# Patient Record
Sex: FEMALE | Race: NATIVE HAWAIIAN OR OTHER PACIFIC ISLANDER | NOT HISPANIC OR LATINO
[De-identification: names, ages, dates, MRNs, and addresses within clinical notes are randomized per-mention and may not be internally consistent; named-entity substitution may affect disease eponyms.]

---

## 2024-09-24 ENCOUNTER — TRANSCRIBE ORDERS (OUTPATIENT)
Dept: OTHER | Age: 59
End: 2024-09-24

## 2024-09-24 DIAGNOSIS — K86.89 MASS OF HEAD OF PANCREAS: Primary | ICD-10-CM

## 2024-09-24 DIAGNOSIS — K86.89 PANCREATIC MASS: ICD-10-CM

## 2024-09-25 ENCOUNTER — TELEPHONE (OUTPATIENT)
Dept: GASTROENTEROLOGY | Facility: CLINIC | Age: 59
End: 2024-09-25

## 2024-09-25 NOTE — TELEPHONE ENCOUNTER
Contacted patient to confirm spelling of her name as it conflicts with Care Everywhere records. Other demographic information aligns, including , phone number and address.     Spoke with patient who states that she is now in the care of [facility] and does not require follow up with our organization.     Will close referral at this time.     Vickie Fontanez RN Care Coordinator

## 2024-10-03 ENCOUNTER — TRANSCRIBE ORDERS (OUTPATIENT)
Dept: OTHER | Age: 59
End: 2024-10-03

## 2024-10-03 DIAGNOSIS — K86.89 PANCREATIC MASS: Primary | ICD-10-CM

## 2024-10-04 ENCOUNTER — PRE VISIT (OUTPATIENT)
Dept: ONCOLOGY | Facility: CLINIC | Age: 59
End: 2024-10-04

## 2024-10-05 NOTE — TELEPHONE ENCOUNTER
RECORDS STATUS - ALL OTHER DIAGNOSIS      RECORDS RECEIVED FROM: Robinson Crum   NOTES STATUS DETAILS   OFFICE NOTE from referring provider     OFFICE NOTE from medical oncologist     OFFICE NOTE from other specialist     DISCHARGE SUMMARY from hospital     DISCHARGE REPORT from the ER     OPERATIVE REPORT [facility] 09/14/24: EUS   MEDICATION LIST     LABS     PATHOLOGY REPORTS Report in [facility] 09/14/24: [id number]    ANYTHING RELATED TO DIAGNOSIS [facility] Most recent 09/17/24   PATHOLOGY [employer] TRACKING   Tracking #:   GENONOMIC TESTING     TYPE:     IMAGING (NEED IMAGES & REPORT)     CT SCANS Req 10/04[facility]:  09/11/24: CT Abd   MRI Req 10/04-[facility]:  09/10/24: MR Abd   ULTRASOUND Req 10/04-[facility]:  09/09/24: US Abd    [place]:  09/09/24: US Abd   PET Req 10/04-[facility]:  02/05/24-01/03/23: PET CT Whole Body

## 2024-10-08 ENCOUNTER — PATIENT OUTREACH (OUTPATIENT)
Dept: SURGERY | Facility: CLINIC | Age: 59
End: 2024-10-08

## 2024-10-08 NOTE — PROGRESS NOTES
New Patient Oncology Nurse Navigator Note     Referring provider: Dr. Whitaker     Referring Clinic/Organization: [facility]     Referred to: Surgical Oncology -  Hepatobiliary / GI Cancers     Requested provider (if applicable): First available provider    Referral Received: 10/08/24       Evaluation for :  pancreas mass      Clinical History (per Nurse review of records provided):      See book marked documents:     Referring MD office note  Pathology report  Imaging reports   Procedure report       Clinical Assessment / Barriers to Care (Per Nurse):    None at this time.     Records Location:     [facility] Everywhere     Records Needed:     ABDOMINAL IMAGING (CT SCANS, MRI, US, PET SCANS)  DATING BACK TO 2018      Additional testing needed prior to consult:     NONE AT THIS TIME    Referral updates and Plan:       Consult with Surgical Oncology     10/08/2024 11:58 AM - called and left a message for patient to call back to schedule consult with surgical oncology, provided scheduling line and my number for call back.     10/08/2024 3:45 PM - attempted to reach patient again to schedule consult with surgery. Called patients referring office to update them that our office is trying to reach her to see if they can also get message to patient to call our scheduling line back to proceed with scheduling. Spoke with Coco BLACKWELL at referring office that we are trying to reach her and that we have a time on Monday available for a virtual visit if should would like. Provided her the scheduling line as well and she will also try and reach patient to call our scheduling line back.     Lily Ko, RN, BSN   Surgical Oncology New Patient Nurse Navigator  [facility]  [phone]

## 2024-10-11 NOTE — TELEPHONE ENCOUNTER
RECORDS STATUS - ALL OTHER DIAGNOSIS      RECORDS RECEIVED FROM: Radames Bowers   DATE RECEIVED:    NOTES STATUS DETAILS   OFFICE NOTE from medical oncologist TUTU Whitaker: 10/10/24   OFFICE NOTE from radiation oncologist TUTU Johns: 6/21/23   MEDICATION LIST  Radames Bowers   LABS     PATHOLOGY REPORTS [facility], Reports in  9/14/24: [id number]  9/11/24: [id number]   ANYTHING RELATED TO DIAGNOSIS Epic/ 10/10/24   IMAGING (NEED IMAGES & REPORT)     CT SCANS PACS [place]  10/10/24    [facility]  9/11/24   MRI PACS [facility]  9/10/24   ULTRASOUND PACS [place]  9/9/24   PET PACS [place]  10/8/24, 2/5/24, 8/29/23, 1/3/23

## 2024-10-14 ENCOUNTER — PRE VISIT (OUTPATIENT)
Dept: SURGERY | Facility: CLINIC | Age: 59
End: 2024-10-14
Payer: COMMERCIAL

## 2024-10-14 ENCOUNTER — VIRTUAL VISIT (OUTPATIENT)
Dept: SURGERY | Facility: CLINIC | Age: 59
End: 2024-10-14
Attending: SURGERY
Payer: COMMERCIAL

## 2024-10-14 VITALS — WEIGHT: 195 LBS | BODY MASS INDEX: 33.29 KG/M2 | HEIGHT: 64 IN

## 2024-10-14 DIAGNOSIS — R17 JAUNDICE: ICD-10-CM

## 2024-10-14 DIAGNOSIS — K86.89 MASS OF HEAD OF PANCREAS: Primary | ICD-10-CM

## 2024-10-14 DIAGNOSIS — K85.00 IDIOPATHIC ACUTE PANCREATITIS WITHOUT INFECTION OR NECROSIS: ICD-10-CM

## 2024-10-14 DIAGNOSIS — C44.42 SQUAMOUS CELL CANCER OF SKIN OF CROWN: ICD-10-CM

## 2024-10-14 PROCEDURE — 99204 OFFICE O/P NEW MOD 45 MIN: CPT | Mod: 95 | Performed by: SURGERY

## 2024-10-14 RX ORDER — LEVOTHYROXINE SODIUM 150 UG/1
150 TABLET ORAL
COMMUNITY
Start: 2024-07-09

## 2024-10-14 RX ORDER — GABAPENTIN 300 MG/1
300 CAPSULE ORAL
COMMUNITY
Start: 2024-08-16

## 2024-10-14 RX ORDER — OXYCODONE HYDROCHLORIDE 5 MG/1
5 TABLET ORAL
COMMUNITY
Start: 2024-10-10

## 2024-10-14 ASSESSMENT — PAIN SCALES - GENERAL: PAINLEVEL: NO PAIN (0)

## 2024-10-14 NOTE — LETTER
10/14/2024      Scarlet Shaikh  [address] [facility] [address]      Dear Colleague,    Thank you for referring your patient, Scarlet Shaikh, to the [facility]. Please see a copy of my visit note below.    Virtual Visit Details    Type of service:  Video Visit   Video Start Time: 8:30 AM  Video End Time:8:53 AM    Originating Location (pt. Location): Home    Distant Location (provider location):  On-site  Platform used for Video Visit: Mignon    Ms. 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  She is currently receiving radiation and immunotherapy for treatment of that.  She reports good response to treatment so far.  Recently the patient developed abnormal LFTs and was further evaluated.  She is found to have a fullness/mass in the pancreas with upstream biliary and pancreatic duct dilation.  This was further evaluated with an endoscopic ultrasound as well as ERCP which confirmed the same.  Biopsies were taken both by brushings as well as FNA.  Both returned benign with no evidence of malignancy.  FISH testing was also done and also was negative.  The patient subsequently developed abdominal pain, which is worse when eating.  She states that she feels like she is getting punched in the gut.  Her lipase has been mildly elevated between 65 and 75.  I was asked her to see her for consideration of next steps of evaluation and management.    Recent CT as well as MRI reveals a nondiscrete mass in the area of the head/neck of the pancreas which appeared to involve the superior mesenteric vein with narrowing.  There is soft tissue changes as well.  There has been no obvious evidence of metastatic disease on multiple PET scans.    I discussed with the patient that the findings of her imaging are very concerning for cancer however her biopsies have not shown that.  I discussed that it is possible that  this is related to pancreatitis, either idiopathic, gallstone related, or due to her immunotherapy for squamous cell cancer.  Having said that I did let her know that the most worrisome thing is that there still could be a cancer there that the biopsies have just failed to prove so far.  Because of that I recommended that we repeat an endoscopic ultrasound and repeat biopsies of this area.  She would like to come to the [facility] for that assessment, which I will arrange for her.    I discussed with the patient that if there is no evidence of malignancy, we would not plan any surgical intervention and rather she would be managed medically for pancreatitis or other etiologies.  If however cancer is identified, I discussed with her that it is involving the major vasculature of her pillo and therefore she would require upfront chemotherapy typically for 6 months followed by reassessment and potential surgery down the road.  This would all have to be considered in light of the fact that she also has squamous cell carcinoma of her scalp with regional lymph node disease as well.  It may be that surgical intervention is not ultimately in her best interest.    We will get her set up to see our GI group for an endoscopic ultrasound as quickly as possible.  I will plan to see the patient shortly thereafter to discuss the results.    The patient inquired about a prescription for oxycodone which I declined.  I indicated to her that she should follow-up with her pain team who currently manages her medications for any need for prescriptions.  She was very accepting of that.    30 minutes were spent face-to-face time.  20 minutes was spent in review of history, imaging, coordination of care.  5 minutes was spent in documentation.    The above was transcribed using Dragon voice recognition software that is now required for use by [facility] and [facility] in place of transcription of  dictated notes.  This change may unfortunately lead into an increase in errors in the EMR. While I reviewed and edited the transcription, I may miss errors.  Please let me know of any of serious errors and I will addend the note.       Again, thank you for allowing me to participate in the care of your patient.        Sincerely,        Armaan Pierce MD

## 2024-10-14 NOTE — PROGRESS NOTES
Virtual Visit Details    Type of service:  Video Visit   Video Start Time: 8:30 AM  Video End Time:8:53 AM    Originating Location (pt. Location): Home    Distant Location (provider location):  On-site  Platform used for Video Visit: AkosuaPete    Ms. 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  She is currently receiving radiation and immunotherapy for treatment of that.  She reports good response to treatment so far.  Recently the patient developed abnormal LFTs and was further evaluated.  She is found to have a fullness/mass in the pancreas with upstream biliary and pancreatic duct dilation.  This was further evaluated with an endoscopic ultrasound as well as ERCP which confirmed the same.  Biopsies were taken both by brushings as well as FNA.  Both returned benign with no evidence of malignancy.  FISH testing was also done and also was negative.  The patient subsequently developed abdominal pain, which is worse when eating.  She states that she feels like she is getting punched in the gut.  Her lipase has been mildly elevated between 65 and 75.  I was asked her to see her for consideration of next steps of evaluation and management.    Recent CT as well as MRI reveals a nondiscrete mass in the area of the head/neck of the pancreas which appeared to involve the superior mesenteric vein with narrowing.  There is soft tissue changes as well.  There has been no obvious evidence of metastatic disease on multiple PET scans.    I discussed with the patient that the findings of her imaging are very concerning for cancer however her biopsies have not shown that.  I discussed that it is possible that this is related to pancreatitis, either idiopathic, gallstone related, or due to her immunotherapy for squamous cell cancer.  Having said that I did let her know that the most worrisome thing is that there still could be a cancer there that the biopsies have just  failed to prove so far.  Because of that I recommended that we repeat an endoscopic ultrasound and repeat biopsies of this area.  She would like to come to the [facility] for that assessment, which I will arrange for her.    I discussed with the patient that if there is no evidence of malignancy, we would not plan any surgical intervention and rather she would be managed medically for pancreatitis or other etiologies.  If however cancer is identified, I discussed with her that it is involving the major vasculature of her pillo and therefore she would require upfront chemotherapy typically for 6 months followed by reassessment and potential surgery down the road.  This would all have to be considered in light of the fact that she also has squamous cell carcinoma of her scalp with regional lymph node disease as well.  It may be that surgical intervention is not ultimately in her best interest.    We will get her set up to see our GI group for an endoscopic ultrasound as quickly as possible.  I will plan to see the patient shortly thereafter to discuss the results.    The patient inquired about a prescription for oxycodone which I declined.  I indicated to her that she should follow-up with her pain team who currently manages her medications for any need for prescriptions.  She was very accepting of that.    30 minutes were spent face-to-face time.  20 minutes was spent in review of history, imaging, coordination of care.  5 minutes was spent in documentation.    The above was transcribed using Dragon voice recognition software that is now required for use by [facility] and [facility] in place of transcription of dictated notes.  This change may unfortunately lead into an increase in errors in the EMR. While I reviewed and edited the transcription, I may miss errors.  Please let me know of any of serious errors and I will addend the note.

## 2024-10-14 NOTE — NURSING NOTE
Current patient location: [address] [place] [id number]    Is the patient currently in the state of [place]? YES    Visit mode:VIDEO    If the visit is dropped, the patient can be reconnected by: VIDEO VISIT: Text to cell phone:   Telephone Information:   Mobile [phone]       Will anyone else be joining the visit? NO  (If patient encounters technical issues they should call [phone]    Are changes needed to the allergy or medication list? Yes Pt is also taking amoxicillin.    Are refills needed on medications prescribed by this physician? NO    Rooming Documentation:  Questionnaire(s) not done per department protocol    Reason for visit: Consult    Skye DE PAZ

## 2024-10-15 ENCOUNTER — PATIENT OUTREACH (OUTPATIENT)
Dept: GASTROENTEROLOGY | Facility: CLINIC | Age: 59
End: 2024-10-15
Payer: COMMERCIAL

## 2024-10-15 NOTE — TELEPHONE ENCOUNTER
Called pt to discuss referral from Dr Pierce for a second opinion EUS/biopsy for possible pancreas CA diagnosis. Pt lives in [place] and does not drive. She would need to find someone within her [place] or a friend to provide transporation. Will call back to discuss timing of procedure, writer did offer Thurs 10/17 as a possibility but Dr Smith also still reviewing request.      PET 10/8/24   IMPRESSION:     Extensive muscular activity and large area of photopenia within the abdomen.  This could decrease sensitivity for detection of FDG avid lesions.   Consider repeat examination.     Focus of increased activity localizing to the sigmoid colon suspicious for colonic neoplasm.  Endoscopic correlation is recommended.     Biliary stent with nonspecific gallbladder wall thickening/fat stranding which could indicate an inflammatory process.   Otherwise, no convincing findings to explain elevated liver enzymes.     Nonspecific soft tissue attenuation about the pancreas which could indicate an inflammatory process or tumor.  Further clinical correlation is advised     Outside EUS done on 9/14/24   FINAL DIAGNOSIS   A. Pancreas, endoscopic ultrasound-guided fine-needle aspiration:                -[place] columnar epithelium of likely gastrointestinal origin admixed with some inflammatory cells and                  debris.      B. Pancreas, endoscopic ultrasound-guided fine-needle aspiration:                -No cytologic evidence of malignancy.      C. Pancreas, endoscopic ultrasound-guided fine-needle aspiration:                -Rare minute strips of bland columnar epithelium of likely gastrointestinal origin admixed with blood.     10/10/24   Lipase   <=60 U/L 75 High          Explained they  will need a , someone to stay with them for 24 hours and should stay in town for 24 hours (within 45 min of Hospital) post procedure    Patient needs to get pre-op physical completed. If outside  health system will need physical  faxed to number [phone]     If you do not get a preop physical, your procedure could be cancelled, patient voiced understanding*    Preop Plan: oncology visit with Dr Whitaker, 10/10/24 at [place] to be utilized    Does patient have any history of gastric bypass/gastric surgery/altered panc/bili anatomy? none    Any recent Covid symptoms or positive covid test?     Does patient have [employer] insurance?:prollie    Med Review    Blood thinner -  none  ASA - none  Diabetic - none  Any meds by injection or mouth for weight loss or diabetes- none    Patient Education r/t procedure: letter    A pre-op nurse will call 1-2 days prior to the procedure.    NPO/Prep:   Adults and Children of all ages may consume solids up to 8 hours prior to arrival time - may consume clear liquids up to 1 hour prior to arrival time.    Verbalized understanding of all instructions. All questions answered.     Procedure order placed, message routed to OR

## 2024-10-16 ENCOUNTER — PREP FOR PROCEDURE (OUTPATIENT)
Dept: GASTROENTEROLOGY | Facility: CLINIC | Age: 59
End: 2024-10-16
Payer: COMMERCIAL

## 2024-10-16 ENCOUNTER — ANESTHESIA EVENT (OUTPATIENT)
Dept: SURGERY | Facility: CLINIC | Age: 59
End: 2024-10-16
Payer: COMMERCIAL

## 2024-10-16 DIAGNOSIS — K86.89 PANCREATIC MASS: ICD-10-CM

## 2024-10-16 DIAGNOSIS — K85.00 IDIOPATHIC ACUTE PANCREATITIS WITHOUT INFECTION OR NECROSIS: Primary | ICD-10-CM

## 2024-10-16 DIAGNOSIS — R17 JAUNDICE: ICD-10-CM

## 2024-10-16 RX ORDER — AMOXICILLIN 250 MG
1 CAPSULE ORAL 2 TIMES DAILY
COMMUNITY
Start: 2023-05-26

## 2024-10-16 RX ORDER — CYCLOBENZAPRINE HCL 5 MG
5-10 TABLET ORAL PRN
COMMUNITY
Start: 2023-12-18 | End: 2024-12-22

## 2024-10-16 ASSESSMENT — LIFESTYLE VARIABLES: TOBACCO_USE: 1

## 2024-10-16 ASSESSMENT — COPD QUESTIONNAIRES: COPD: 0

## 2024-10-16 NOTE — TELEPHONE ENCOUNTER
Pt returned call and confirmed that 10/17 with arrival around noon will work. Her daughter will provide transportation. Waiting for Dr Smith's final review to determine if ERCP is needed, but will plan on EUS with MAC , 20 mins scope time. Order placed and msg routed to OR .  Pt does not have mychart or email, provided address and parking options. Did tell pt to expect PAN call to confirm arrival time and discuss NPO guidelines. Pt will be staying locally for the 24 hours following. Will update Dr Pierce's team once plan is solidified with Dr Smith.

## 2024-10-16 NOTE — ANESTHESIA PREPROCEDURE EVALUATION
"Anesthesia Pre-Procedure Evaluation    Patient: Scarlet Shaikh   MRN: [id number] : 1965        Procedure : Procedure(s):  ENDOSCOPIC ULTRASOUND, ESOPHAGOSCOPY / UPPER GASTROINTESTINAL TRACT (GI)          No past medical history on file.   No past surgical history on file.   Allergies   Allergen Reactions    Acetaminophen Other (See Comments)     Can not have acetaminophen due to liver issues (advised by PCP not to take).    Aspirin GI Disturbance    Ibuprofen GI Disturbance      Social History     Tobacco Use    Smoking status: Every Day     Types: Cigarettes    Smokeless tobacco: Never   Substance Use Topics    Alcohol use: Not on file      Wt Readings from Last 1 Encounters:   10/14/24 88.5 kg (195 lb)        Anesthesia Evaluation            ROS/MED HX  ENT/Pulmonary:     (+) sleep apnea,               tobacco use,                     (-) COPD   Neurologic:     (+)    peripheral neuropathy,                         (-) no CVA and no TIA   Cardiovascular:     (+)  - -  CAD -  - -                                   (-) angina, past MI, stent, angina, past MI and CABG   METS/Exercise Tolerance:  Comment: Mets of 4   Hematologic: Comments: Iron deficiency anemia    Last Hgb 12    (+)      anemia,          Musculoskeletal:       GI/Hepatic: Comment: Pancreatitis  Pancreatic mass  Jaundice    Choledocholithiasis, now post ERCP    Elevated LFTs    (+) GERD,            liver disease,       Renal/Genitourinary:       Endo:     (+)          thyroid problem, hypothyroidism,    Obesity,       Psychiatric/Substance Use:     (+) psychiatric history anxiety       Infectious Disease:       Malignancy: Comment: Squamous cell carcinoma of the scalp  (+) Malignancy, History of Skin and Other.Other CA Mass of head of pancreas status post.    Other:      (+)  , H/O Chronic Pain,            OUTSIDE LABS:  CBC: No results found for: \"WBC\", \"HGB\", \"HCT\", \"PLT\"  BMP: No results found for: \"NA\", \"POTASSIUM\", \"CHLORIDE\", " "\"CO2\", \"BUN\", \"CR\", \"GLC\"  COAGS: No results found for: \"PTT\", \"INR\", \"FIBR\"  POC: No results found for: \"BGM\", \"HCG\", \"HCGS\"  HEPATIC: No results found for: \"ALBUMIN\", \"PROTTOTAL\", \"ALT\", \"AST\", \"GGT\", \"ALKPHOS\", \"BILITOTAL\", \"BILIDIRECT\", \"NATHALIE\"  OTHER: No results found for: \"PH\", \"LACT\", \"A1C\", \"ALEXANDRA\", \"PHOS\", \"MAG\", \"LIPASE\", \"AMYLASE\", \"TSH\", \"T4\", \"T3\", \"CRP\", \"SED\"    Anesthesia Plan    ASA Status:  3       Anesthesia Type: MAC.     - Reason for MAC: immobility needed, straight local not clinically adequate   Induction: Intravenous, Propofol.   Maintenance: TIVA.   Techniques and Equipment:     - Lines/Monitors: BIS     Consents            Postoperative Care    Pain management: IV analgesics, Oral pain medications.   PONV prophylaxis: Ondansetron (or other 5HT-3), Dexamethasone or Solumedrol     Comments:               Martínez Bojorquez MD    I have reviewed the pertinent notes and labs in the chart from the past 30 days and (re)examined the patient.  Any updates or changes from those notes are reflected in this note.                       # Obesity: Estimated body mass index is 33.47 kg/m  as calculated from the following:    Height as of 10/14/24: 1.626 m (5' 4\").    Weight as of 10/14/24: 88.5 kg (195 lb).             "

## 2024-10-17 ENCOUNTER — ANESTHESIA (OUTPATIENT)
Dept: SURGERY | Facility: CLINIC | Age: 59
End: 2024-10-17
Payer: COMMERCIAL

## 2024-10-17 ENCOUNTER — HOSPITAL ENCOUNTER (OUTPATIENT)
Facility: CLINIC | Age: 59
Discharge: HOME HEALTH | End: 2024-10-17
Attending: INTERNAL MEDICINE | Admitting: INTERNAL MEDICINE
Payer: COMMERCIAL

## 2024-10-17 VITALS
OXYGEN SATURATION: 99 % | TEMPERATURE: 97.9 F | SYSTOLIC BLOOD PRESSURE: 150 MMHG | HEIGHT: 64 IN | WEIGHT: 198.19 LBS | BODY MASS INDEX: 33.84 KG/M2 | DIASTOLIC BLOOD PRESSURE: 88 MMHG | RESPIRATION RATE: 16 BRPM | HEART RATE: 89 BPM

## 2024-10-17 PROCEDURE — 88305 TISSUE EXAM BY PATHOLOGIST: CPT | Mod: 26 | Performed by: PATHOLOGY

## 2024-10-17 PROCEDURE — 250N000011 HC RX IP 250 OP 636: Performed by: NURSE ANESTHETIST, CERTIFIED REGISTERED

## 2024-10-17 PROCEDURE — 272N000001 HC OR GENERAL SUPPLY STERILE: Performed by: INTERNAL MEDICINE

## 2024-10-17 PROCEDURE — 999N000141 HC STATISTIC PRE-PROCEDURE NURSING ASSESSMENT: Performed by: INTERNAL MEDICINE

## 2024-10-17 PROCEDURE — 710N000012 HC RECOVERY PHASE 2, PER MINUTE: Performed by: INTERNAL MEDICINE

## 2024-10-17 PROCEDURE — 360N000075 HC SURGERY LEVEL 2, PER MIN: Performed by: INTERNAL MEDICINE

## 2024-10-17 PROCEDURE — 88173 CYTOPATH EVAL FNA REPORT: CPT | Mod: 26 | Performed by: PATHOLOGY

## 2024-10-17 PROCEDURE — 258N000003 HC RX IP 258 OP 636: Performed by: NURSE ANESTHETIST, CERTIFIED REGISTERED

## 2024-10-17 PROCEDURE — 250N000009 HC RX 250: Performed by: NURSE ANESTHETIST, CERTIFIED REGISTERED

## 2024-10-17 PROCEDURE — 43238 EGD US FINE NEEDLE BX/ASPIR: CPT | Performed by: ANESTHESIOLOGY

## 2024-10-17 PROCEDURE — 88172 CYTP DX EVAL FNA 1ST EA SITE: CPT | Mod: 26 | Performed by: PATHOLOGY

## 2024-10-17 PROCEDURE — 370N000017 HC ANESTHESIA TECHNICAL FEE, PER MIN: Performed by: INTERNAL MEDICINE

## 2024-10-17 PROCEDURE — 43238 EGD US FINE NEEDLE BX/ASPIR: CPT | Performed by: NURSE ANESTHETIST, CERTIFIED REGISTERED

## 2024-10-17 PROCEDURE — 250N000013 HC RX MED GY IP 250 OP 250 PS 637

## 2024-10-17 PROCEDURE — 88173 CYTOPATH EVAL FNA REPORT: CPT | Mod: TC | Performed by: INTERNAL MEDICINE

## 2024-10-17 RX ORDER — HYDROMORPHONE HCL IN WATER/PF 6 MG/30 ML
0.4 PATIENT CONTROLLED ANALGESIA SYRINGE INTRAVENOUS EVERY 5 MIN PRN
Status: DISCONTINUED | OUTPATIENT
Start: 2024-10-17 | End: 2024-10-17 | Stop reason: HOSPADM

## 2024-10-17 RX ORDER — NALOXONE HYDROCHLORIDE 0.4 MG/ML
0.1 INJECTION, SOLUTION INTRAMUSCULAR; INTRAVENOUS; SUBCUTANEOUS
Status: DISCONTINUED | OUTPATIENT
Start: 2024-10-17 | End: 2024-10-17 | Stop reason: HOSPADM

## 2024-10-17 RX ORDER — LABETALOL HYDROCHLORIDE 5 MG/ML
10 INJECTION, SOLUTION INTRAVENOUS
Status: DISCONTINUED | OUTPATIENT
Start: 2024-10-17 | End: 2024-10-17 | Stop reason: HOSPADM

## 2024-10-17 RX ORDER — ONDANSETRON 4 MG/1
4 TABLET, ORALLY DISINTEGRATING ORAL EVERY 6 HOURS PRN
Status: CANCELLED | OUTPATIENT
Start: 2024-10-17

## 2024-10-17 RX ORDER — OXYCODONE HYDROCHLORIDE 5 MG/1
5 TABLET ORAL
Status: DISCONTINUED | OUTPATIENT
Start: 2024-10-17 | End: 2024-10-17 | Stop reason: HOSPADM

## 2024-10-17 RX ORDER — HYDROMORPHONE HCL IN WATER/PF 6 MG/30 ML
0.2 PATIENT CONTROLLED ANALGESIA SYRINGE INTRAVENOUS EVERY 5 MIN PRN
Status: DISCONTINUED | OUTPATIENT
Start: 2024-10-17 | End: 2024-10-17 | Stop reason: HOSPADM

## 2024-10-17 RX ORDER — ONDANSETRON 4 MG/1
4 TABLET, ORALLY DISINTEGRATING ORAL EVERY 30 MIN PRN
Status: DISCONTINUED | OUTPATIENT
Start: 2024-10-17 | End: 2024-10-17 | Stop reason: HOSPADM

## 2024-10-17 RX ORDER — ONDANSETRON 2 MG/ML
INJECTION INTRAMUSCULAR; INTRAVENOUS PRN
Status: DISCONTINUED | OUTPATIENT
Start: 2024-10-17 | End: 2024-10-17

## 2024-10-17 RX ORDER — SODIUM CHLORIDE, SODIUM LACTATE, POTASSIUM CHLORIDE, CALCIUM CHLORIDE 600; 310; 30; 20 MG/100ML; MG/100ML; MG/100ML; MG/100ML
INJECTION, SOLUTION INTRAVENOUS CONTINUOUS PRN
Status: DISCONTINUED | OUTPATIENT
Start: 2024-10-17 | End: 2024-10-17

## 2024-10-17 RX ORDER — FENTANYL CITRATE 50 UG/ML
50 INJECTION, SOLUTION INTRAMUSCULAR; INTRAVENOUS EVERY 5 MIN PRN
Status: DISCONTINUED | OUTPATIENT
Start: 2024-10-17 | End: 2024-10-17 | Stop reason: HOSPADM

## 2024-10-17 RX ORDER — FLUMAZENIL 0.1 MG/ML
0.2 INJECTION, SOLUTION INTRAVENOUS
Status: CANCELLED | OUTPATIENT
Start: 2024-10-17 | End: 2024-10-18

## 2024-10-17 RX ORDER — SODIUM CHLORIDE, SODIUM LACTATE, POTASSIUM CHLORIDE, CALCIUM CHLORIDE 600; 310; 30; 20 MG/100ML; MG/100ML; MG/100ML; MG/100ML
INJECTION, SOLUTION INTRAVENOUS CONTINUOUS
Status: DISCONTINUED | OUTPATIENT
Start: 2024-10-17 | End: 2024-10-17 | Stop reason: HOSPADM

## 2024-10-17 RX ORDER — LIDOCAINE HYDROCHLORIDE 20 MG/ML
INJECTION, SOLUTION INFILTRATION; PERINEURAL PRN
Status: DISCONTINUED | OUTPATIENT
Start: 2024-10-17 | End: 2024-10-17

## 2024-10-17 RX ORDER — LIDOCAINE 40 MG/G
CREAM TOPICAL
Status: DISCONTINUED | OUTPATIENT
Start: 2024-10-17 | End: 2024-10-17 | Stop reason: HOSPADM

## 2024-10-17 RX ORDER — PROPOFOL 10 MG/ML
INJECTION, EMULSION INTRAVENOUS PRN
Status: DISCONTINUED | OUTPATIENT
Start: 2024-10-17 | End: 2024-10-17

## 2024-10-17 RX ORDER — OXYCODONE HYDROCHLORIDE 10 MG/1
10 TABLET ORAL
Status: COMPLETED | OUTPATIENT
Start: 2024-10-17 | End: 2024-10-17

## 2024-10-17 RX ORDER — DEXAMETHASONE SODIUM PHOSPHATE 4 MG/ML
4 INJECTION, SOLUTION INTRA-ARTICULAR; INTRALESIONAL; INTRAMUSCULAR; INTRAVENOUS; SOFT TISSUE
Status: DISCONTINUED | OUTPATIENT
Start: 2024-10-17 | End: 2024-10-17 | Stop reason: HOSPADM

## 2024-10-17 RX ORDER — ONDANSETRON 2 MG/ML
4 INJECTION INTRAMUSCULAR; INTRAVENOUS EVERY 30 MIN PRN
Status: DISCONTINUED | OUTPATIENT
Start: 2024-10-17 | End: 2024-10-17 | Stop reason: HOSPADM

## 2024-10-17 RX ORDER — PROPOFOL 10 MG/ML
INJECTION, EMULSION INTRAVENOUS CONTINUOUS PRN
Status: DISCONTINUED | OUTPATIENT
Start: 2024-10-17 | End: 2024-10-17

## 2024-10-17 RX ORDER — ONDANSETRON 2 MG/ML
4 INJECTION INTRAMUSCULAR; INTRAVENOUS EVERY 6 HOURS PRN
Status: CANCELLED | OUTPATIENT
Start: 2024-10-17

## 2024-10-17 RX ORDER — GLYCOPYRROLATE 0.2 MG/ML
INJECTION, SOLUTION INTRAMUSCULAR; INTRAVENOUS PRN
Status: DISCONTINUED | OUTPATIENT
Start: 2024-10-17 | End: 2024-10-17

## 2024-10-17 RX ORDER — FENTANYL CITRATE 50 UG/ML
25 INJECTION, SOLUTION INTRAMUSCULAR; INTRAVENOUS EVERY 5 MIN PRN
Status: DISCONTINUED | OUTPATIENT
Start: 2024-10-17 | End: 2024-10-17 | Stop reason: HOSPADM

## 2024-10-17 RX ADMIN — SODIUM CHLORIDE, POTASSIUM CHLORIDE, SODIUM LACTATE AND CALCIUM CHLORIDE: 600; 310; 30; 20 INJECTION, SOLUTION INTRAVENOUS at 16:54

## 2024-10-17 RX ADMIN — PROPOFOL 30 MG: 10 INJECTION, EMULSION INTRAVENOUS at 16:30

## 2024-10-17 RX ADMIN — GLYCOPYRROLATE 0.2 MG: 0.2 INJECTION, SOLUTION INTRAMUSCULAR; INTRAVENOUS at 16:26

## 2024-10-17 RX ADMIN — ONDANSETRON 4 MG: 2 INJECTION INTRAMUSCULAR; INTRAVENOUS at 17:10

## 2024-10-17 RX ADMIN — LIDOCAINE HYDROCHLORIDE 60 MG: 20 INJECTION, SOLUTION INFILTRATION; PERINEURAL at 16:26

## 2024-10-17 RX ADMIN — MIDAZOLAM 2 MG: 1 INJECTION INTRAMUSCULAR; INTRAVENOUS at 16:19

## 2024-10-17 RX ADMIN — SODIUM CHLORIDE, POTASSIUM CHLORIDE, SODIUM LACTATE AND CALCIUM CHLORIDE: 600; 310; 30; 20 INJECTION, SOLUTION INTRAVENOUS at 16:19

## 2024-10-17 RX ADMIN — PROPOFOL 30 MG: 10 INJECTION, EMULSION INTRAVENOUS at 16:26

## 2024-10-17 RX ADMIN — OXYCODONE HYDROCHLORIDE 10 MG: 10 TABLET ORAL at 17:40

## 2024-10-17 RX ADMIN — PROPOFOL 75 MCG/KG/MIN: 10 INJECTION, EMULSION INTRAVENOUS at 16:30

## 2024-10-17 ASSESSMENT — ACTIVITIES OF DAILY LIVING (ADL)
ADLS_ACUITY_SCORE: 29

## 2024-10-17 NOTE — ANESTHESIA CARE TRANSFER NOTE
Patient: Scarlet Shaikh    Procedure: Procedure(s):  ENDOSCOPIC ULTRASOUND, ESOPHAGOSCOPY / UPPER GASTROINTESTINAL TRACT (GI), Biopsy of Pancreatic mass       Diagnosis: Idiopathic acute pancreatitis without infection or necrosis [K85.00]  Pancreatic mass [K86.89]  Jaundice [R17]  Diagnosis Additional Information: No value filed.    Anesthesia Type:   MAC     Note:    Oropharynx: oropharynx clear of all foreign objects and spontaneously breathing  Level of Consciousness: drowsy  Oxygen Supplementation: room air    Independent Airway: airway patency satisfactory and stable  Dentition: dentition unchanged  Vital Signs Stable: post-procedure vital signs reviewed and stable  Report to RN Given: handoff report given  Patient transferred to: Phase II    Handoff Report: Identifed the Patient, Identified the Reponsible Provider, Reviewed the pertinent medical history, Discussed the surgical course, Reviewed Intra-OP anesthesia mangement and issues during anesthesia, Set expectations for post-procedure period and Allowed opportunity for questions and acknowledgement of understanding      Vitals:  Vitals Value Taken Time   /85 10/17/24 1726   Temp 98.3    Pulse 71    Resp 14    SpO2 98 % 10/17/24 1730   Vitals shown include unfiled device data.    Electronically Signed By: Annabelle Ko CRNA, JUANY HARDIN  October 17, 2024  5:31 PM

## 2024-10-17 NOTE — DISCHARGE INSTRUCTIONS
Contacting your Doctor -   To contact a doctor, call Dr. Smith at the Surgery Clinic @ [phone]  or  Dr Smith at the  GI clinic at [phone]     :  [phone] and ask for the resident on call for Gastroenterology (answered 24 hours a day)   Emergency Department:  [facility]: [phone]  [facility]: [phone] if you are in need of immediate or emergent help

## 2024-10-17 NOTE — ANESTHESIA POSTPROCEDURE EVALUATION
Patient: Scarlet Shaikh    Procedure: Procedure(s):  ENDOSCOPIC ULTRASOUND, ESOPHAGOSCOPY / UPPER GASTROINTESTINAL TRACT (GI), Biopsy of Pancreatic mass       Anesthesia Type:  MAC    Note:  Disposition: Outpatient   Postop Pain Control: Uneventful            Sign Out: Well controlled pain   PONV: No   Neuro/Psych: Uneventful            Sign Out: Acceptable/Baseline neuro status   Airway/Respiratory: Uneventful            Sign Out: Acceptable/Baseline resp. status   CV/Hemodynamics: Uneventful            Sign Out: Acceptable CV status; No obvious hypovolemia; No obvious fluid overload   Other NRE: NONE   DID A NON-ROUTINE EVENT OCCUR? No           Last vitals:  Vitals Value Taken Time   /88 10/17/24 1802   Temp 36.8  C (98.2  F) 10/17/24 1725   Pulse     Resp 12 10/17/24 1745   SpO2 96 % 10/17/24 1802   Vitals shown include unfiled device data.    Electronically Signed By: John Wan MD  October 17, 2024  6:22 PM

## 2024-10-17 NOTE — BRIEF OP NOTE
[facility]    Brief Operative Note    Pre-operative diagnosis: Idiopathic acute pancreatitis without infection or necrosis [K85.00]  Pancreatic mass [K86.89]  Jaundice [R17]  Post-operative diagnosis Same as pre-operative diagnosis    Procedure: ENDOSCOPIC ULTRASOUND, ESOPHAGOSCOPY / UPPER GASTROINTESTINAL TRACT (GI), Biopsy of Pancreatic mass, N/A - Esophagus    Surgeon: Surgeons and Role:     * Yasmany Smith MD - Primary  Anesthesia: MAC   Estimated Blood Loss: None    Drains: None  Specimens:   ID Type Source Tests Collected by Time Destination   1 : pancreatic head mass Fine Needle Aspiration Pancreas FINE NEEDLE ASPIRATE Yasmany Smith MD 10/17/2024  4:45 PM      Findings:     Plastic biliary stent in place.  There was a mid-biliary stricture evident despite the stent. A vague slightly hypoechoic and enlarged region was seen in the pancreas here measuring up to 3 cm in diameter. This was very difficult to visualize through and it was not possible to adequately visualize the SMV or portal vein. The margins were difficult to reliably determine. There was mild upstream pancreatic duct dilation to 3-4 mm with internal debris in the duct.   The SMA was not in contact with this region.    No focal hepatic or left adrenal lesions were seen.  There was no adenopathy.  The gallbladder was mildly prominent with borderline wall thickening to 3-4 mm. There was internal sludge/stone debris.  Trace perisplenic ascites was present.    Needle biopsy of the mass was initially performed from the stomach due to increased periduodenal vessels. Four passes were made using a 22 ga biopsy needle. Preliminary cytology was atypical. Four passes were then made using a 22 ga EchoTip fine needle via the duodenum, focusing on the region of bile duct stricture. Preliminary was again only read as atypical.  Complications: None.  Implants: * No implants in log *    Await final  cytology. Planned discharge to home.  Further recommendations pending cytology results.    PAVEL Smith MD  Professor of Medicine  Division of Gastroenterology, Hepatology and Nutrition  [facility]

## 2024-10-21 LAB — UPPER EUS: NORMAL

## 2024-10-22 LAB
PATH REPORT.COMMENTS IMP SPEC: ABNORMAL
PATH REPORT.COMMENTS IMP SPEC: YES
PATH REPORT.FINAL DX SPEC: ABNORMAL
PATH REPORT.GROSS SPEC: ABNORMAL
PATH REPORT.MICROSCOPIC SPEC OTHER STN: ABNORMAL
PATH REPORT.RELEVANT HX SPEC: ABNORMAL

## 2024-10-23 ENCOUNTER — TELEPHONE (OUTPATIENT)
Dept: GASTROENTEROLOGY | Facility: CLINIC | Age: 59
End: 2024-10-23
Payer: COMMERCIAL

## 2024-10-23 NOTE — TELEPHONE ENCOUNTER
Biopsy results from EUS (pancreatic mass) returned positive for well-differentiated adenocarcinoma.  Called both pt and daughter's numbers and unable to reach. VM left for pt to call my office. Will continue to attempt contacting tomorrow.    Message sent to Dr. Pierce as well. Unable to message Dr. Whitaker from [facility] via [facility]. Will have staff contact that office.    PAVEL Smith MD  Professor of Medicine  Division of Gastroenterology, Hepatology and Nutrition  [facility]

## 2024-10-24 ENCOUNTER — DOCUMENTATION ONLY (OUTPATIENT)
Dept: GASTROENTEROLOGY | Facility: CLINIC | Age: 59
End: 2024-10-24
Payer: COMMERCIAL

## 2024-10-24 ENCOUNTER — PATIENT OUTREACH (OUTPATIENT)
Dept: GASTROENTEROLOGY | Facility: CLINIC | Age: 59
End: 2024-10-24
Payer: COMMERCIAL

## 2024-10-24 ENCOUNTER — TELEPHONE (OUTPATIENT)
Dept: GASTROENTEROLOGY | Facility: CLINIC | Age: 59
End: 2024-10-24
Payer: COMMERCIAL

## 2024-10-24 NOTE — TELEPHONE ENCOUNTER
Final results from pathology showed adenocarcinoma.    Reviewed with Dr. Pierce who recommended medical oncology management.    I was able to get in touch with pt - she is aware of the results.  Message left with Dr. Cruz's office to clarify if he can provider her oncology care or wants referral here.    Pt instructed to contact his office middle of next week if she has not heard re oncology follow-up.    PAVEL Smith MD  Professor of Medicine  Division of Gastroenterology, Hepatology and Nutrition  [facility]

## 2024-10-24 NOTE — TELEPHONE ENCOUNTER
"Dr Whitaker nurse returned Dr Smith's call, he would like the patient's oncology needs followed within our organization to help manage the new diagnosis.   They do not have resources for surg onc, etc to manage it.   But if chemo is needed or follow up is recommended locally after her care with us, she said that they would be able to take that on later.    Medical oncology referral in place.     Per Dr Smith \"need biliary stent exchange for a metal stent if getting chemo.\". Will wait to see oncology treatment plan to determine timing of this.    Katherine Nieves, RN, BSN,   [facility]  Care coordinator        "

## 2024-10-24 NOTE — PROGRESS NOTES
Per Dr Luis Murphy's EUS report from 10/17 and fine needle aspirate pathology results to Attn: Dr. Whitaker ([place]) from [facility] on 10/24/2024 @ 3:15    Fax [phone]         Norma Whittington MA

## 2024-10-25 ENCOUNTER — PATIENT OUTREACH (OUTPATIENT)
Dept: ONCOLOGY | Facility: CLINIC | Age: 59
End: 2024-10-25
Payer: COMMERCIAL

## 2024-10-25 NOTE — PROGRESS NOTES
New Patient Oncology Nurse Navigator Note     Referring provider: Dr Smith, GI    Referring Clinic/Organization: [facility]     Referred to: Medical Oncology    Requested provider (if applicable): First available - did not specify     Referral Received: 10/24/24       Evaluation for : pancreatic malignancy     Clinical History (per Nurse review of records provided):      10/8/2024 PET ([facility])  IMPRESSION:   Extensive muscular activity and large area of photopenia within the abdomen.  This could decrease sensitivity for detection of FDG avid lesions.   Consider repeat examination.     Focus of increased activity localizing to the sigmoid colon suspicious for colonic neoplasm.  Endoscopic correlation is recommended.     Biliary stent with nonspecific gallbladder wall thickening/fat stranding which could indicate an inflammatory process.   Otherwise, no convincing findings to explain elevated liver enzymes.     Nonspecific soft tissue attenuation about the pancreas which could indicate an inflammatory process or tumor.  Further clinical correlation is advised.       10/10/2024 CT Abd/Pelvis ([facility])  MPRESSION:   There is a common bile duct stent in place. There is persistent fullness of the pancreatic head and dilation of the distal pancreatic duct.     There is increased vague soft tissue fullness in the pillo hepatis with an indeterminate appearance. Although nonspecific, progressive lymphadenopathy or mass lesion are not excluded.       10/14/2024 MR Brain ([facility])  IMPRESSION:   1. No acute infarct or intracranial mass.   2. Unchanged lesion in the right parapharyngeal / deep parotid space. Limited differential includes salivary gland neoplasm and neurogenic tumor.   3. Lesion in the scalp is less pronounced. Physical exam may better evaluate.       10/17/2024 EUS ([facility])  Impression:     - BIliary stricture within the panceratic head readily                          identified despite the presence of  an indwelling                          plastic biliary strent.                          - Adjacent 3.5 x 2.0 round hypoechoic mass. Needle                          biopsy obtained from multiple locations and results                          pending.                          - No arterial invasion seen. Difficult to visualized                          the portal vein/SMV due to poor through transmission                          of the mass.                          - Mild upstream pancreatic duct dilation with                          stricture at the region of the mass and internal                          debris of unclear significance. Hypoechoic upstream                          pancreatic parenchyma vs the downstream parenchyma                          consistent with obstructive changes.                          - No adenopathy, liver or left adrenal lesions.                          - Incidental debris/sludge in the bile duct and                          gallbladder.                          - Trace perisplenic ascites.                          DDx includes primary pancreatic malignancy, pancreatic                          metastatic disease related to the scalp cancer and                          potentially immunotherapy related pancreatitis. Cannot                          exclude mass-forming chronic pancreatitis however this                          would be a diagnosis of exclusion.     Final Diagnosis   PANCREAS, PANCREATIC HEAD MASS, FINE NEEDLE ASPIRATION:  Interpretation:  - Positive for malignancy  - Morphologically compatible with well-differentiated adenocarcinoma     Adequacy: Satisfactory for evaluation       Electronically signed by Bulmaro Kim MD on 10/22/2024 at 10:49 AM   Comment    Intradepartmental peer review obtained.   Clinical Information    59-year-old woman with history of scalp squamous cell carcinoma, with pancreatic head mass         Clinical Assessment / Barriers  to Care (Per Nurse):    Pt with hx of scalp SCC. EUS at [facility] confirms new pancreatic adenocarcinoma. Per local [facility] Dr Whitaker, he prefers pt to get [facility] GI Med Onc consult first for recommendations. Pt will get actual treatment more locally with [place].    We will offer consult for next week ([facility] 11/1 video or in-person) or schedule per pt preference.      Records Location: [facility] Everywhere     Records Needed:     Outside imaging    Additional testing needed prior to consult:     N/A          Mukul Link, RN, BSN, OCN  Oncology New Patient Nurse Navigator   [facility]  [phone]

## 2024-10-28 ENCOUNTER — TELEPHONE (OUTPATIENT)
Dept: ONCOLOGY | Facility: CLINIC | Age: 59
End: 2024-10-28

## 2024-10-28 ENCOUNTER — VIRTUAL VISIT (OUTPATIENT)
Dept: SURGERY | Facility: CLINIC | Age: 59
End: 2024-10-28
Attending: SURGERY
Payer: COMMERCIAL

## 2024-10-28 VITALS — BODY MASS INDEX: 31.99 KG/M2 | WEIGHT: 192 LBS | HEIGHT: 65 IN

## 2024-10-28 DIAGNOSIS — C25.0 MALIGNANT NEOPLASM OF HEAD OF PANCREAS (H): Primary | ICD-10-CM

## 2024-10-28 PROCEDURE — 99441 PR PHYSICIAN TELEPHONE EVALUATION 5-10 MIN: CPT | Mod: 93 | Performed by: SURGERY

## 2024-10-28 ASSESSMENT — PAIN SCALES - GENERAL: PAINLEVEL_OUTOF10: NO PAIN (0)

## 2024-10-28 NOTE — PROGRESS NOTES
I spoke to Ms. Shaikh today regarding her pancreas mass.  Recent endoscopic ultrasound have confirmed the presence of an adenocarcinoma.  Her tumor is locally advanced with encasement of the hepatic artery as well as portal vein and I discussed with her that this is a nonresectable tumor.  I discussed that my recommendation would be palliative chemotherapy.  Unfortunately this is not a curable disease but I did tell the chemotherapy could help to extend her life.  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    I offered that I be very happy to review imaging in the future if she has a remarkable response to her treatment but we will not set up a planned follow-up visit with me given the extent of her tumor.    The patient seems to have a clear understanding of her situation and she will await urine from Dr. Whitaker office for ongoing cares.    10 minutes was spent on telephone call today.  5 minutes was spent on review of recent findings and coordination of care.  5 minutes were spent documentation.    The above was transcribed using Dragon voice recognition software that is now required for use by [facility] and [facility] in place of transcription of dictated notes.  This change may unfortunately lead into an increase in errors in the EMR. While I reviewed and edited the transcription, I may miss errors.  Please let me know of any of serious errors and I will addend the note.

## 2024-10-28 NOTE — NURSING NOTE
Current patient location: [address] [place] [address]    Is the patient currently in the state of [place]? YES    Visit mode:TELEPHONE    If the visit is dropped, the patient can be reconnected by: TELEPHONE VISIT: Phone number: [phone]    Will anyone else be joining the visit? NO  (If patient encounters technical issues they should call [phone]    Are changes needed to the allergy or medication list? Pt stated no changes to allergies and Pt stated no med changes    Are refills needed on medications prescribed by this physician? NO    Rooming Documentation:  Unable to complete questionnaire(s) due to time    Reason for visit: SERGO DE PAZ

## 2024-10-28 NOTE — LETTER
10/28/2024      Scarlet Shaikh  [address] [facility] [address]      Dear Colleague,    Thank you for referring your patient, Scarlet Shaikh, to the [facility]. Please see a copy of my visit note below.    I spoke to Ms. Shaikh today regarding her pancreas mass.  Recent endoscopic ultrasound have confirmed the presence of an adenocarcinoma.  Her tumor is locally advanced with encasement of the hepatic artery as well as portal vein and I discussed with her that this is a nonresectable tumor.  I discussed that my recommendation would be palliative chemotherapy.  Unfortunately this is not a curable disease but I did tell the chemotherapy could help to extend her life.  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    I offered that I be very happy to review imaging in the future if she has a remarkable response to her treatment but we will not set up a planned follow-up visit with me given the extent of her tumor.    The patient seems to have a clear understanding of her situation and she will await urine from Dr. Whitaker office for ongoing cares.    10 minutes was spent on telephone call today.  5 minutes was spent on review of recent findings and coordination of care.  5 minutes were spent documentation.    The above was transcribed using Dragon voice recognition software that is now required for use by [facility] and [facility] in place of transcription of dictated notes.  This change may unfortunately lead into an increase in errors in the EMR. While I reviewed and edited the transcription, I may miss errors.  Please let me know of any of serious errors and I will addend the note.       Again, thank you for allowing me to participate  in the care of your patient.        Sincerely,        Armaan Pierce MD

## 2024-10-28 NOTE — TELEPHONE ENCOUNTER
RECORDS STATUS - ALL OTHER DIAGNOSIS      RECORDS RECEIVED FROM: [facility]   NOTES STATUS DETAILS   OFFICE NOTE from referring provider Epic 10/28/24: Dr. Armaan Pierce   OFFICE NOTE from medical oncologist [facility] 10/10/24: Dr. Bulmaro Whitaker   OFFICE NOTE from other specialist [facility] Rad Onc:  06/21/23: Dr. Phyllis Johns   OPERATIVE REPORT [facility] 10/17/24: EUS   MEDICATION LIST [facility]    LABS     PATHOLOGY REPORTS Report in [facility] 10/17/24: [id number]   ANYTHING RELATED TO DIAGNOSIS [facility] Most recent 10/10/24   IMAGING (NEED IMAGES & REPORT)     CT SCANS PACS 10/10/24: CT AP  09/11/24: CT Abd   MRI PACS 09/10/24: MR Abd   ULTRASOUND PACS 09/09/24: US Abd   PET PACS 10/08/24, 08/29/23, 01/03/23: PET Onc

## 2024-10-28 NOTE — TELEPHONE ENCOUNTER
[facility]: Surgical Oncology Cancer Care Short Note                                     Discussion with Patient:                                                          OUTBOUND CALL:     Left a message for RN with Dr. Bulmaro Whitaker MD at [facility]. Matt Gomez met with Dr. Pierce today to review pathology report and she has non-resectable disease. Relayed she needs to start treatment with their oncology team.     Encouraged their team to call with any further questions or concerns. Direct contact number provided.     Jennifer Grant RNCC  [facility]   Surgical Oncology

## 2024-10-29 ENCOUNTER — PATIENT OUTREACH (OUTPATIENT)
Dept: GASTROENTEROLOGY | Facility: CLINIC | Age: 59
End: 2024-10-29
Payer: COMMERCIAL

## 2024-10-29 NOTE — TELEPHONE ENCOUNTER
Spoke with patient about needed ERCP to change current stent to metal stent. Per Dr Smith, this should be done prior to starting chemotherapy. Pt is meeting with Dr Sumner and Dr Whitaker office on Friday and will have a better idea of the chemo plan. Is not comfortable returning to [facility], where the stent was placed. So may consider coming back to [place] for the stent exchange. She will call back once she speaks with oncology to schedule procedure.     Katherine Nieves, RN, BSN,   [facility]  Care coordinator

## 2024-10-30 NOTE — TELEPHONE ENCOUNTER
[facility]: Surgical Oncology Cancer Care Short Note                                     Discussion with Patient:                                                       INBOUND CALL:     Received call from Dr. Whitaker. He received message regarding Scarlet being no resectable. He will reach out to Scarlet to schedule follow-up.     Dr. Cruz verbalized concern over Scarlet's recent colonoscopy. He asked if Dr. Pierce had seen it or could offer any input. He stated he is planning to place a referral for the Colorectal Surgery since he feels her situation may be oo complex for their local team. Dr. Whitaker provided his cell number in the event Dr. Pierce wanted to call him to discuss (# [phone]).     FYI message sent to Dr. Pierce.     Encouraged Dr. Whitaker to call with any further questions or concerns. Direct contact number provided.     Jennifer Grant RNCC  [facility]   Surgical Oncology       Approximately 10 minutes was spent in conversation with the patient/caregiver/provider.

## 2024-11-01 ENCOUNTER — PRE VISIT (OUTPATIENT)
Dept: ONCOLOGY | Facility: CLINIC | Age: 59
End: 2024-11-01
Payer: COMMERCIAL

## 2024-11-01 ENCOUNTER — VIRTUAL VISIT (OUTPATIENT)
Dept: ONCOLOGY | Facility: CLINIC | Age: 59
End: 2024-11-01
Attending: STUDENT IN AN ORGANIZED HEALTH CARE EDUCATION/TRAINING PROGRAM
Payer: COMMERCIAL

## 2024-11-01 VITALS — BODY MASS INDEX: 31.99 KG/M2 | HEIGHT: 65 IN | WEIGHT: 192 LBS

## 2024-11-01 DIAGNOSIS — C25.0 MALIGNANT NEOPLASM OF HEAD OF PANCREAS (H): ICD-10-CM

## 2024-11-01 DIAGNOSIS — K86.89 MASS OF HEAD OF PANCREAS: Primary | ICD-10-CM

## 2024-11-01 PROCEDURE — G2211 COMPLEX E/M VISIT ADD ON: HCPCS | Mod: 95 | Performed by: STUDENT IN AN ORGANIZED HEALTH CARE EDUCATION/TRAINING PROGRAM

## 2024-11-01 PROCEDURE — 99205 OFFICE O/P NEW HI 60 MIN: CPT | Mod: 95 | Performed by: STUDENT IN AN ORGANIZED HEALTH CARE EDUCATION/TRAINING PROGRAM

## 2024-11-01 ASSESSMENT — PAIN SCALES - GENERAL: PAINLEVEL_OUTOF10: WORST PAIN (10)

## 2024-11-01 NOTE — NURSING NOTE
Current patient location: [address] [place] [address]    Is the patient currently in the state of [place]? YES    Visit mode:VIDEO    If the visit is dropped, the patient can be reconnected by: VIDEO VISIT: Text to cell phone:   Telephone Information:   Mobile [phone]       Will anyone else be joining the visit? NO  (If patient encounters technical issues they should call [phone])    Are changes needed to the allergy or medication list? No    Are refills needed on medications prescribed by this physician? NO    Rooming Documentation:  Questionnaire(s) not done per department protocol    Reason for visit: Consult    kSye DE PAZ

## 2024-11-01 NOTE — LETTER
2024      Scarlet Shaikh  [address] [facility] [address]      Dear Colleague,    Thank you for referring your patient, Scarlet Shaikh, to the [facility]. Please see a copy of my visit note below.    [facility] - Medical Oncology TeleHealth Consult Note  2024    Patient Identifiers     Name: Scarlet Shaikh  Preferred Address: Ms. Shaikh  Preferred Language: English  : 1965  Gender: female    Assessment and Plan     Ms. 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    NGS: pending  Immuno: pending  Clinical Trial: pending progression    I spoke with Ms. Shaikh regarding her diagnosis and potential treatment.  Briefly, she has been undergoing treatment for her scalp SCC with immunotherapy until abnormalities in her LFTs were noted.  Imaging revealed a pancreatic mass, with biopsy now confirming pancreatic adenocarcinoma.  She met with surgical oncology and reviewed the imaging, which revealed unresectable localized pancreas cancer.  She feels very hopeless following this diagnosis and revelation and discussed that hopelessness in some extent with me.  She also communicated with me her ongoing symptoms, which include intermittent severe nausea without vomiting, brought on with no significant inciting causes.  Along with worsening mid abdominal/back pain with radiation to her sides and legs.  Finally, she is also experiencing some new constipation as of the past week or so.  We reviewed that much of the symptoms is likely driven by her pancreas cancer diagnosis.    Regarding her cancer, we agree with surgical oncology recommendations and feel that she would be best served by systemic treatment.  Options for systemic therapy include  first-line FOLFIRINOX versus first-line [place]/Abraxane.  The PRISCILLA-3 regimen of nal-IRIFOX would also be reasonable, but has not shown head-to-head superiority over FOLFIRINOX, and is significantly more expensive.  We reviewed the generic side effects associated with chemotherapeutic treatment, and emphasized that we would recommend treatment only if her symptom relief was greater than those potential side effects.  In particular, we would want chemotherapy to improve the back pain she has been experiencing, without significantly worsening her underlying nausea and discomfort.    Regarding her struggles with her diagnosis, we feel that referrals to mental health services and palliative care would be important in her overall management.  These services can provide her the support she needs in this challenging time.  We would also recommend that she undergo NGS testing with one of the established comprehensive tests.  In order to ensure that the results go directly to Dr. Whitaker, and because they would not alter first-line treatment recommendations, we will defer sending that testing today.    We will arrange a clinical visit in 6 months to review her ongoing management with her primary oncologic team, and provide any subspecialty support that they may need.    60 minutes spent on the date of the encounter doing chart review, review of test results, interpretation of tests, patient visit, and documentation     The longitudinal plan of care for the diagnosis(es)/condition(s) as documented were addressed during this visit. Due to the added complexity in care, I will continue to support Scarlet in the subsequent management and with ongoing continuity of care.    John Sumner MD, PhD   of Medicine  Division of Hematology, Oncology and Transplantation  [facility]    -----------------------------------    Oncology Summary     Cancer Staging   No matching staging information was found for the  patient.      Oncology History    No history exists.       Subjective/Interval Events     - very down about the diagnosis and her prognosis. Is questioning whether she should just 'give up' and let the cancer take her.  - severe intermittent nausea which she feels will prevent her from considering chemotherapy for her pancreas cancer  - notes mid-abdominal pain with radiation to the left side and back. Has not had a bowel movement for 4 days. Is not uncomfortable.   - also notes radiation R leg pain  - currently on hold with the immunotherapy, but is planning on restarting today. Only notable side effect has been rash, well managed on creams.     Objective Data     Lab data:  I have personally reviewed the lab data, notable for:    - Na 135  - WBC 5.7  - Hgb 13.3  - Plt 192    Radiology data:  I have personally reviewed the radiology data, notable for:  10/10/24 CT Abd/Pelvis  IMPRESSION:     There is a common bile duct stent in place. There is persistent fullness of the pancreatic head and dilation of the distal pancreatic duct.     There is increased vague soft tissue fullness in the pillo hepatis with an indeterminate appearance. Although nonspecific, progressive lymphadenopathy or mass lesion are not excluded.     Pathology and other data:  I have personally reviewed and interpreted the pathology data, notable for:    10/17/24 FNA  Final Diagnosis   PANCREAS, PANCREATIC HEAD MASS, FINE NEEDLE ASPIRATION:  Interpretation:  - Positive for malignancy  - Morphologically compatible with well-differentiated adenocarcinoma     Adequacy: Satisfactory for evaluation       Electronically signed by Bulmaro Kim MD on 10/22/2024 at 10:49 AM     [facility]     Virtual Visit Details    Type of service:  Video Visit   Video Start Time:  7:50 PM  Video End Time: 8:15 PM    Originating Location (pt. Location): Home    Distant Location (provider location):  On-site  Platform used for Video Visit:  [employer]      Again, thank you for allowing me to participate in the care of your patient.        Sincerely,        John Sumner MD

## 2024-11-01 NOTE — PROGRESS NOTES
[facility] - Medical Oncology TeleHealth Consult Note  2024    Patient Identifiers     Name: Scarlet Shaikh  Preferred Address: Ms. Shaikh  Preferred Language: English  : 1965  Gender: female    Assessment and Plan     Ms. 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    NGS: pending  Immuno: pending  Clinical Trial: pending progression    I spoke with Ms. Shaikh regarding her diagnosis and potential treatment.  Briefly, she has been undergoing treatment for her scalp SCC with immunotherapy until abnormalities in her LFTs were noted.  Imaging revealed a pancreatic mass, with biopsy now confirming pancreatic adenocarcinoma.  She met with surgical oncology and reviewed the imaging, which revealed unresectable localized pancreas cancer.  She feels very hopeless following this diagnosis and revelation and discussed that hopelessness in some extent with me.  She also communicated with me her ongoing symptoms, which include intermittent severe nausea without vomiting, brought on with no significant inciting causes.  Along with worsening mid abdominal/back pain with radiation to her sides and legs.  Finally, she is also experiencing some new constipation as of the past week or so.  We reviewed that much of the symptoms is likely driven by her pancreas cancer diagnosis.    Regarding her cancer, we agree with surgical oncology recommendations and feel that she would be best served by systemic treatment.  Options for systemic therapy include first-line FOLFIRINOX versus first-line [place]/Abraxane.  The PRISCILLA-3 regimen of nal-IRIFOX would also be reasonable, but has not shown head-to-head superiority over FOLFIRINOX, and is significantly more expensive.  We reviewed the generic side effects associated  with chemotherapeutic treatment, and emphasized that we would recommend treatment only if her symptom relief was greater than those potential side effects.  In particular, we would want chemotherapy to improve the back pain she has been experiencing, without significantly worsening her underlying nausea and discomfort.    Regarding her struggles with her diagnosis, we feel that referrals to mental health services and palliative care would be important in her overall management.  These services can provide her the support she needs in this challenging time.  We would also recommend that she undergo NGS testing with one of the established comprehensive tests.  In order to ensure that the results go directly to Dr. Whitaker, and because they would not alter first-line treatment recommendations, we will defer sending that testing today.    We will arrange a clinical visit in 6 months to review her ongoing management with her primary oncologic team, and provide any subspecialty support that they may need.    60 minutes spent on the date of the encounter doing chart review, review of test results, interpretation of tests, patient visit, and documentation     The longitudinal plan of care for the diagnosis(es)/condition(s) as documented were addressed during this visit. Due to the added complexity in care, I will continue to support [place] in the subsequent management and with ongoing continuity of care.    John Sumner MD, PhD   of Medicine  Division of Hematology, Oncology and Transplantation  [facility]    -----------------------------------    Oncology Summary     Cancer Staging   No matching staging information was found for the patient.      Oncology History    No history exists.       Subjective/Interval Events     - very down about the diagnosis and her prognosis. Is questioning whether she should just 'give up' and let the cancer take her.  - severe intermittent nausea which she  feels will prevent her from considering chemotherapy for her pancreas cancer  - notes mid-abdominal pain with radiation to the left side and back. Has not had a bowel movement for 4 days. Is not uncomfortable.   - also notes radiation R leg pain  - currently on hold with the immunotherapy, but is planning on restarting today. Only notable side effect has been rash, well managed on creams.     Objective Data     Lab data:  I have personally reviewed the lab data, notable for:    - Na 135  - WBC 5.7  - Hgb 13.3  - Plt 192    Radiology data:  I have personally reviewed the radiology data, notable for:  10/10/24 CT Abd/Pelvis  IMPRESSION:     There is a common bile duct stent in place. There is persistent fullness of the pancreatic head and dilation of the distal pancreatic duct.     There is increased vague soft tissue fullness in the pillo hepatis with an indeterminate appearance. Although nonspecific, progressive lymphadenopathy or mass lesion are not excluded.     Pathology and other data:  I have personally reviewed and interpreted the pathology data, notable for:    10/17/24 FNA  Final Diagnosis   PANCREAS, PANCREATIC HEAD MASS, FINE NEEDLE ASPIRATION:  Interpretation:  - Positive for malignancy  - Morphologically compatible with well-differentiated adenocarcinoma     Adequacy: Satisfactory for evaluation       Electronically signed by Bulmaro Kim MD on 10/22/2024 at 10:49 AM     Billing Stuff     Virtual Visit Details    Type of service:  Video Visit   Video Start Time:  7:50 PM  Video End Time: 8:15 PM    Originating Location (pt. Location): Home    Distant Location (provider location):  On-site  Platform used for Video Visit: Scannx

## 2024-11-08 NOTE — TELEPHONE ENCOUNTER
Pt states she is having the ERCP/stent exchange at [facility] on 11/18. No need for any further GI care with [facility] sheryl M. Msg routed to Dr Smith as update.

## 2024-11-19 ENCOUNTER — CARE COORDINATION (OUTPATIENT)
Dept: GASTROENTEROLOGY | Facility: CLINIC | Age: 59
End: 2024-11-19
Payer: COMMERCIAL

## 2024-11-19 NOTE — PROGRESS NOTES
RN[facility] chart review.   As follow up to Dr Smith  EUS on this patient as a second opinion which did confirm pancreas adeno. She had a previous EGD/ERCP on 9/11 at [place] and had a stent placed at that time. Dr Smith left the stent and now is asking that her current stent be changed to a metal stent in prep for chemo    Per chart review patient had ERCP at [facility] on 11/28/24     -  One 10 mm by 6 cm transpapillary uncovered metal stent was placed 5 cm into             the common bile duct.  Bile flowed through the stent.  The stent was in good             position.     Patient has scheduled follow up with [facility] in May 2025. Pt can continue endoscopic care at [facility] for now.  Vanna Leal RN Care Coordinator

## 2025-05-05 ENCOUNTER — VIRTUAL VISIT (OUTPATIENT)
Dept: ONCOLOGY | Facility: CLINIC | Age: 60
End: 2025-05-05
Attending: STUDENT IN AN ORGANIZED HEALTH CARE EDUCATION/TRAINING PROGRAM
Payer: COMMERCIAL

## 2025-05-05 VITALS — BODY MASS INDEX: 31.07 KG/M2 | WEIGHT: 182 LBS | HEIGHT: 64 IN

## 2025-05-05 DIAGNOSIS — C80.1 CANCER (H): ICD-10-CM

## 2025-05-05 DIAGNOSIS — C25.0 MALIGNANT NEOPLASM OF HEAD OF PANCREAS (H): Primary | ICD-10-CM

## 2025-05-05 PROCEDURE — 98007 SYNCH AUDIO-VIDEO EST HI 40: CPT | Performed by: STUDENT IN AN ORGANIZED HEALTH CARE EDUCATION/TRAINING PROGRAM

## 2025-05-05 ASSESSMENT — PAIN SCALES - GENERAL: PAINLEVEL_OUTOF10: MILD PAIN (3)

## 2025-05-05 NOTE — NURSING NOTE
Current patient location: [address] [facility] [address]    Is the patient currently in the state of [place]? YES    Visit mode: VIDEO    If the visit is dropped, the patient can be reconnected by:VIDEO VISIT: Text to cell phone:   Telephone Information:   Mobile [phone]       Will anyone else be joining the visit? NO  (If patient encounters technical issues they should call [phone]    Are changes needed to the allergy or medication list? No    Patient denies any changes and states that all information remains accurate since last reviewed/verified.     Are refills needed on medications prescribed by this physician? NO    Rooming Documentation:  Questionnaire(s) completed    No other vitals to report today    Reason for visit: SERGO Barrios MA VVF

## 2025-05-05 NOTE — LETTER
2025      Scarlet Shaikh  [address] [facility] [address]      Dear Colleague,    Thank you for referring your patient, Scarlet Shaikh, to the [facility]. Please see a copy of my visit note below.    [facility] - Medical Oncology TeleHealth Consult Note  2025    Patient Identifiers     Name: Scarlet Shaikh  Preferred Address: Ms. Shaikh  Preferred Language: English  : 1965  Gender: female    Assessment and Plan     Ms. Scarlet Shaikh is a 59 year old female with a history of scal SCC on cemuplimab, and unresectable stage I pancreatic adenocarincoma (10/17/24) not on treatment who returns by virtual visit for further management counseling.    NGS: not readily available in chart  Immuno: N/A  Clinical Trial: pending progression    I spoke with Ms. Shaikh extensively regarding her multiple potential malignancy diagnoses, and their workup over the course of the past 6 months.  Briefly, she had planned to initiate treatment for her pancreatic adenocarcinoma until clinical issues in late  resulted in the discovery of some rectal polyps concerning for either adenoma or adenocarcinoma.  Further workup at that time did not reveal further definitive adenocarcinoma, so she has been managed largely with superficial resection and endoscopic surveillance.  This ongoing workup had prevented her from initiating any antineoplastic therapy for her pancreatic cancer.  Yet she has had no significant growth in her primary pancreatic lesion, and no evidence of metastasis on the most recent imaging seen in January.  She is planned to undergo repeat imaging in a few days, at which time she will make further decisions regarding potential chemotherapeutic treatment with her primary oncologic team.  NGS testing is not readily available for me to review with her today.    She feels much less anxiety regarding her diagnosis today  than she had previously, and appears to have a good overall plan regarding her goals of care and anticancer therapy.  In the absence of new NGS testing, I have limited subspecialty feedback regarding cancer management or clinical trial recommendations.  Our phase 1 trial clinic remains available to her if she desires anticancer therapy and progresses on treatment in the future.  She is doing extremely well, and will continue follow-up with her primary oncologic team.  She will reach out to the [facility] if questions or issues arise in future.    45 minutes spent on the date of the encounter doing chart review, review of test results, interpretation of tests, patient visit, and documentation     John Sumner MD, PhD   of Medicine  Division of Hematology, Oncology and Transplantation  [facility]    -----------------------------------    Oncology Summary      Cancer Staging   Malignant neoplasm of head of pancreas (H)  Staging form: Pancreas, AJCC 8th Edition  - Clinical: Stage IB (cT2, cN0, cM0) - Signed by John Sumner MD on 5/5/2025      Oncology History   Malignant neoplasm of head of pancreas (H)   10/28/2024 Initial Diagnosis    Malignant neoplasm of head of pancreas (H)     5/5/2025 -  Cancer Staged    Staging form: Pancreas, AJCC 8th Edition  - Clinical: Stage IB (cT2, cN0, cM0)         Subjective/Interval Events     - is planning to undergo CT scans on May 15th  - discussed the symptoms of progressive cancer  - continues to have intermittent back pain, improved with Reiki (performed monthly)  - the oily and smelly stools resolved after her hospitalization for rectal polyps.    Objective Data     Lab data:  I have personally reviewed the lab data, notable for:    - CEA 4.6  -  193    Radiology data:  I have personally reviewed the radiology data, notable for:  01/03/2025 CT Chest/Abd/Pelvis  IMPRESSION:   1. Locally advanced pancreatic adenocarcinoma is grossly unchanged from  the prior exam.   2. No new abnormality demonstrated in the chest abdomen or pelvis.     Pathology and other data:  I have personally reviewed and interpreted the pathology data, notable for:    - no new data    Billing Stuff     Virtual Visit Details    Type of service:  Video Visit   Video Start Time: 10:09 AM  Video End Time: 10:24 AM    Originating Location (pt. Location): Home    Distant Location (provider location):  On-site  Platform used for Video Visit: Mignon      Again, thank you for allowing me to participate in the care of your patient.        Sincerely,        John Sumner MD    Electronically signed

## 2025-05-05 NOTE — PROGRESS NOTES
[facility] - Medical Oncology TeleHealth Consult Note  2025    Patient Identifiers     Name: Scarlet Shaikh  Preferred Address: Ms. Shaikh  Preferred Language: English  : 1965  Gender: female    Assessment and Plan     Ms. Scarlet Shaikh is a 59 year old female with a history of scal SCC on cemuplimab, and unresectable stage I pancreatic adenocarincoma (10/17/24) not on treatment who returns by virtual visit for further management counseling.    NGS: not readily available in chart  Immuno: N/A  Clinical Trial: pending progression    I spoke with Ms. Shaikh extensively regarding her multiple potential malignancy diagnoses, and their workup over the course of the past 6 months.  Briefly, she had planned to initiate treatment for her pancreatic adenocarcinoma until clinical issues in late  resulted in the discovery of some rectal polyps concerning for either adenoma or adenocarcinoma.  Further workup at that time did not reveal further definitive adenocarcinoma, so she has been managed largely with superficial resection and endoscopic surveillance.  This ongoing workup had prevented her from initiating any antineoplastic therapy for her pancreatic cancer.  Yet she has had no significant growth in her primary pancreatic lesion, and no evidence of metastasis on the most recent imaging seen in January.  She is planned to undergo repeat imaging in a few days, at which time she will make further decisions regarding potential chemotherapeutic treatment with her primary oncologic team.  NGS testing is not readily available for me to review with her today.    She feels much less anxiety regarding her diagnosis today than she had previously, and appears to have a good overall plan regarding her goals of care and anticancer therapy.  In the absence of new NGS testing, I have limited subspecialty feedback regarding cancer management or clinical trial recommendations.  Our  phase 1 trial clinic remains available to her if she desires anticancer therapy and progresses on treatment in the future.  She is doing extremely well, and will continue follow-up with her primary oncologic team.  She will reach out to the [facility] if questions or issues arise in future.    45 minutes spent on the date of the encounter doing chart review, review of test results, interpretation of tests, patient visit, and documentation     John Sumner MD, PhD   of Medicine  Division of Hematology, Oncology and Transplantation  [facility]    -----------------------------------    Oncology Summary      Cancer Staging   Malignant neoplasm of head of pancreas (H)  Staging form: Pancreas, AJCC 8th Edition  - Clinical: Stage IB (cT2, cN0, cM0) - Signed by John Sumner MD on 5/5/2025      Oncology History   Malignant neoplasm of head of pancreas (H)   10/28/2024 Initial Diagnosis    Malignant neoplasm of head of pancreas (H)     5/5/2025 -  Cancer Staged    Staging form: Pancreas, AJCC 8th Edition  - Clinical: Stage IB (cT2, cN0, cM0)         Subjective/Interval Events     - is planning to undergo CT scans on May 15th  - discussed the symptoms of progressive cancer  - continues to have intermittent back pain, improved with Reiki (performed monthly)  - the oily and smelly stools resolved after her hospitalization for rectal polyps.    Objective Data     Lab data:  I have personally reviewed the lab data, notable for:    - CEA 4.6  -  193    Radiology data:  I have personally reviewed the radiology data, notable for:  01/03/2025 CT Chest/Abd/Pelvis  IMPRESSION:   1. Locally advanced pancreatic adenocarcinoma is grossly unchanged from the prior exam.   2. No new abnormality demonstrated in the chest abdomen or pelvis.     Pathology and other data:  I have personally reviewed and interpreted the pathology data, notable for:    - no new data    Billing Stuff     Virtual Visit  Details    Type of service:  Video Visit   Video Start Time: 10:09 AM  Video End Time: 10:24 AM    Originating Location (pt. Location): Home    Distant Location (provider location):  On-site  Platform used for Video Visit: Mignon

## (undated) DEVICE — ENDO BITE BLOCK ADULT OMNI-BLOC

## (undated) DEVICE — KIT ENDO FIRST STEP DISINFECTANT 200ML W/POUCH EP-4

## (undated) DEVICE — SUCTION MANIFOLD NEPTUNE 2 SYS 4 PORT 0702-020-000

## (undated) DEVICE — ENDO NDL BALL TIP ULTRASOUND 22GA 5.2FR ECHO-3-22

## (undated) DEVICE — PACK ENDOSCOPY GI CUSTOM UMMC

## (undated) DEVICE — ENDO TUBING CO2 SMARTCAP STERILE DISP 100145CO2EXT

## (undated) DEVICE — NEEDLE BIOPSY ECHOTIP ACUCORE 22 GA ULTRASOUND G59746

## (undated) DEVICE — ENDO PROBE COVER ULTRASOUND BALLOON LATEX  MAJ-249

## (undated) RX ORDER — ONDANSETRON 2 MG/ML
INJECTION INTRAMUSCULAR; INTRAVENOUS
Status: DISPENSED
Start: 2024-10-17

## (undated) RX ORDER — GLYCOPYRROLATE 0.2 MG/ML
INJECTION, SOLUTION INTRAMUSCULAR; INTRAVENOUS
Status: DISPENSED
Start: 2024-10-17

## (undated) RX ORDER — ESMOLOL HYDROCHLORIDE 10 MG/ML
INJECTION INTRAVENOUS
Status: DISPENSED
Start: 2024-10-17

## (undated) RX ORDER — FENTANYL CITRATE 50 UG/ML
INJECTION, SOLUTION INTRAMUSCULAR; INTRAVENOUS
Status: DISPENSED
Start: 2024-10-17

## (undated) RX ORDER — FENTANYL CITRATE-0.9 % NACL/PF 10 MCG/ML
PLASTIC BAG, INJECTION (ML) INTRAVENOUS
Status: DISPENSED
Start: 2024-10-17

## (undated) RX ORDER — PROPOFOL 10 MG/ML
INJECTION, EMULSION INTRAVENOUS
Status: DISPENSED
Start: 2024-10-17

## (undated) RX ORDER — DEXAMETHASONE SODIUM PHOSPHATE 4 MG/ML
INJECTION, SOLUTION INTRA-ARTICULAR; INTRALESIONAL; INTRAMUSCULAR; INTRAVENOUS; SOFT TISSUE
Status: DISPENSED
Start: 2024-10-17

## (undated) RX ORDER — OXYCODONE HYDROCHLORIDE 10 MG/1
TABLET ORAL
Status: DISPENSED
Start: 2024-10-17

## (undated) RX ORDER — ALBUTEROL SULFATE 90 UG/1
INHALANT RESPIRATORY (INHALATION)
Status: DISPENSED
Start: 2024-10-17

## (undated) RX ORDER — LEVOFLOXACIN 5 MG/ML
INJECTION, SOLUTION INTRAVENOUS
Status: DISPENSED
Start: 2024-10-17